# Patient Record
Sex: FEMALE | URBAN - METROPOLITAN AREA
[De-identification: names, ages, dates, MRNs, and addresses within clinical notes are randomized per-mention and may not be internally consistent; named-entity substitution may affect disease eponyms.]

---

## 2021-04-01 ENCOUNTER — APPOINTMENT (RX ONLY)
Dept: URBAN - METROPOLITAN AREA CLINIC 41 | Facility: CLINIC | Age: 61
Setting detail: DERMATOLOGY
End: 2021-04-01

## 2021-04-01 DIAGNOSIS — D22 MELANOCYTIC NEVI: ICD-10-CM

## 2021-04-01 DIAGNOSIS — L82.1 OTHER SEBORRHEIC KERATOSIS: ICD-10-CM

## 2021-04-01 DIAGNOSIS — Z71.89 OTHER SPECIFIED COUNSELING: ICD-10-CM

## 2021-04-01 PROBLEM — D22.39 MELANOCYTIC NEVI OF OTHER PARTS OF FACE: Status: ACTIVE | Noted: 2021-04-01

## 2021-04-01 PROCEDURE — ? RECOMMENDATIONS

## 2021-04-01 PROCEDURE — ? OTHER

## 2021-04-01 PROCEDURE — ? TREATMENT REGIMEN

## 2021-04-01 PROCEDURE — ? COUNSELING

## 2021-04-01 PROCEDURE — 99203 OFFICE O/P NEW LOW 30 MIN: CPT

## 2021-04-01 ASSESSMENT — LOCATION SIMPLE DESCRIPTION DERM
LOCATION SIMPLE: LEFT FOREHEAD
LOCATION SIMPLE: RIGHT TEMPLE
LOCATION SIMPLE: RIGHT CHEEK
LOCATION SIMPLE: LEFT CHEEK

## 2021-04-01 ASSESSMENT — LOCATION ZONE DERM: LOCATION ZONE: FACE

## 2021-04-01 ASSESSMENT — LOCATION DETAILED DESCRIPTION DERM
LOCATION DETAILED: RIGHT MID TEMPLE
LOCATION DETAILED: LEFT INFERIOR FOREHEAD
LOCATION DETAILED: RIGHT INFERIOR TEMPLE
LOCATION DETAILED: LEFT CENTRAL BUCCAL CHEEK
LOCATION DETAILED: RIGHT SUPERIOR MEDIAL BUCCAL CHEEK

## 2021-04-01 NOTE — PROCEDURE: OTHER
Other (Free Text): Recommended to just leave them alone. She would be trading a mole for a scar
Note Text (......Xxx Chief Complaint.): This diagnosis correlates with the
Detail Level: Simple
Render Risk Assessment In Note?: no

## 2021-04-01 NOTE — PROCEDURE: COUNSELING
Sunscreen Recommendations: Mineral based sunscreens containing zinc and titanium
Detail Level: Simple
Sunscreen Recommendations: Mineral based sunscreen daily
Detail Level: Generalized

## 2021-04-01 NOTE — PROCEDURE: RECOMMENDATIONS
Recommendation Preamble: The following were recommended during the visit: zinc and titanium based sunscreen. Apply to face, neck, and chest
Render Risk Assessment In Note?: no
Detail Level: Zone